# Patient Record
Sex: FEMALE
[De-identification: names, ages, dates, MRNs, and addresses within clinical notes are randomized per-mention and may not be internally consistent; named-entity substitution may affect disease eponyms.]

---

## 2017-06-23 ENCOUNTER — TRANSCRIPTION ENCOUNTER (OUTPATIENT)
Age: 57
End: 2017-06-23

## 2022-10-28 PROBLEM — Z00.00 ENCOUNTER FOR PREVENTIVE HEALTH EXAMINATION: Status: ACTIVE | Noted: 2022-10-28

## 2023-03-23 ENCOUNTER — APPOINTMENT (OUTPATIENT)
Dept: ORTHOPEDIC SURGERY | Facility: CLINIC | Age: 63
End: 2023-03-23
Payer: COMMERCIAL

## 2023-03-23 VITALS — BODY MASS INDEX: 24.91 KG/M2 | WEIGHT: 155 LBS | HEIGHT: 66 IN

## 2023-03-23 DIAGNOSIS — E78.00 PURE HYPERCHOLESTEROLEMIA, UNSPECIFIED: ICD-10-CM

## 2023-03-23 PROCEDURE — 73010 X-RAY EXAM OF SHOULDER BLADE: CPT | Mod: 50

## 2023-03-23 PROCEDURE — 99203 OFFICE O/P NEW LOW 30 MIN: CPT

## 2023-03-23 PROCEDURE — 73030 X-RAY EXAM OF SHOULDER: CPT | Mod: 50

## 2023-03-23 NOTE — PHYSICAL EXAM
[Right] : right shoulder [NL (0-180)] : full active abduction 0-180 degrees [Left] : left shoulder [NL (0-70)] : full internal rotation 0-70 degrees [] : negative Speed's

## 2023-03-23 NOTE — IMAGING
[Bilateral] : shoulder bilaterally [There are no fractures, subluxations or dislocations. No significant abnormalities are seen] : There are no fractures, subluxations or dislocations. No significant abnormalities are seen

## 2023-03-23 NOTE — DISCUSSION/SUMMARY
[de-identified] : 62f with b/l shoulder impingement and bursitis\par 1) start physical therapy\par 2) nsaids prn \par 3) cryotherapy, rest and activity modification\par 4) rtc 6 weeks for re-eval \par \par \par Entered by Promise Torres acting as scribe.\par

## 2023-03-23 NOTE — HISTORY OF PRESENT ILLNESS
[Gradual] : gradual [5] : 5 [0] : 0 [Dull/Aching] : dull/aching [Occasional] : occasional [Meds] : meds [de-identified] : 03/23/2023 Ms. EDGAR AGUDELO, a 62 year old female, presents today for b/l shoulders. Reports that over the past few months she has been experiencing b/l shoulder pain. Pain has been waking her up at night. Reports pain aggravated with reaching OH and behind. Denies n/t or radiating pain.  Has tried motrin with partial relief. Activity modifications help to minimize pain. \par Occupation: Nurse at [employer]  [] : no [FreeTextEntry9] : yrn [de-identified] : certain movements

## 2023-05-04 ENCOUNTER — APPOINTMENT (OUTPATIENT)
Dept: ORTHOPEDIC SURGERY | Facility: CLINIC | Age: 63
End: 2023-05-04
Payer: COMMERCIAL

## 2023-05-04 VITALS — WEIGHT: 155 LBS | BODY MASS INDEX: 24.91 KG/M2 | HEIGHT: 66 IN

## 2023-05-04 DIAGNOSIS — M75.42 IMPINGEMENT SYNDROME OF LEFT SHOULDER: ICD-10-CM

## 2023-05-04 DIAGNOSIS — M75.41 IMPINGEMENT SYNDROME OF RIGHT SHOULDER: ICD-10-CM

## 2023-05-04 PROCEDURE — 99213 OFFICE O/P EST LOW 20 MIN: CPT

## 2023-05-04 NOTE — DISCUSSION/SUMMARY
[de-identified] : 62f with b/l shoulder impingement and bursitis. Improved with PT\par 1) continue with home exercises\par 2) activity as tolerated\par 3) rtc prn \par \par Entered by Promise Torres acting as scribe.\par

## 2023-05-04 NOTE — PHYSICAL EXAM
[Bilateral] : shoulder bilaterally [NL (0-180)] : full active abduction 0-180 degrees [NL (0-70)] : full internal rotation 0-70 degrees [] : motor and sensory intact distally

## 2023-05-04 NOTE — HISTORY OF PRESENT ILLNESS
[0] : 0 [de-identified] : 03/23/2023 Ms. EDGAR AGUDELO, a 62 year old female, presents today for b/l shoulders. Reports that over the past few months she has been experiencing b/l shoulder pain. Pain has been waking her up at night. Reports pain aggravated with reaching OH and behind. Denies n/t or radiating pain.  Has tried motrin with partial relief. Activity modifications help to minimize pain. 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 Pt went to four physical therapy session and feeling much improvement. Pt has been doing home exercises in btw and feels improvement. ROM Improving.  [de-identified] : Physical Therapy

## 2024-04-01 ENCOUNTER — NON-APPOINTMENT (OUTPATIENT)
Age: 64
End: 2024-04-01